# Patient Record
Sex: MALE
[De-identification: names, ages, dates, MRNs, and addresses within clinical notes are randomized per-mention and may not be internally consistent; named-entity substitution may affect disease eponyms.]

---

## 2024-07-24 ENCOUNTER — APPOINTMENT (OUTPATIENT)
Dept: PLASTIC SURGERY | Facility: CLINIC | Age: 29
End: 2024-07-24
Payer: COMMERCIAL

## 2024-07-24 VITALS
DIASTOLIC BLOOD PRESSURE: 102 MMHG | WEIGHT: 180 LBS | HEIGHT: 74 IN | BODY MASS INDEX: 23.1 KG/M2 | HEART RATE: 92 BPM | OXYGEN SATURATION: 98 % | SYSTOLIC BLOOD PRESSURE: 136 MMHG

## 2024-07-24 DIAGNOSIS — F64.9 GENDER IDENTITY DISORDER, UNSPECIFIED: ICD-10-CM

## 2024-07-24 PROBLEM — Z00.00 ENCOUNTER FOR PREVENTIVE HEALTH EXAMINATION: Status: ACTIVE | Noted: 2024-07-24

## 2024-07-24 PROCEDURE — 99204 OFFICE O/P NEW MOD 45 MIN: CPT

## 2024-10-21 ENCOUNTER — APPOINTMENT (OUTPATIENT)
Dept: CT IMAGING | Facility: CLINIC | Age: 29
End: 2024-10-21
Payer: COMMERCIAL

## 2024-10-21 ENCOUNTER — OUTPATIENT (OUTPATIENT)
Dept: OUTPATIENT SERVICES | Facility: HOSPITAL | Age: 29
LOS: 1 days | End: 2024-10-21

## 2024-10-21 PROCEDURE — 70486 CT MAXILLOFACIAL W/O DYE: CPT | Mod: 26

## 2024-10-22 ENCOUNTER — TRANSCRIPTION ENCOUNTER (OUTPATIENT)
Age: 29
End: 2024-10-22

## 2024-12-02 ENCOUNTER — APPOINTMENT (OUTPATIENT)
Dept: PLASTIC SURGERY | Facility: CLINIC | Age: 29
End: 2024-12-02

## 2024-12-02 DIAGNOSIS — F64.9 GENDER IDENTITY DISORDER, UNSPECIFIED: ICD-10-CM

## 2024-12-02 PROCEDURE — 99214 OFFICE O/P EST MOD 30 MIN: CPT

## 2024-12-16 VITALS
SYSTOLIC BLOOD PRESSURE: 139 MMHG | WEIGHT: 197.98 LBS | TEMPERATURE: 99 F | RESPIRATION RATE: 16 BRPM | DIASTOLIC BLOOD PRESSURE: 81 MMHG | HEART RATE: 86 BPM | HEIGHT: 74 IN | OXYGEN SATURATION: 95 %

## 2024-12-17 ENCOUNTER — INPATIENT (INPATIENT)
Facility: HOSPITAL | Age: 29
LOS: 1 days | Discharge: HOME | End: 2024-12-19
Attending: SURGERY | Admitting: SURGERY
Payer: MEDICAID

## 2024-12-17 ENCOUNTER — APPOINTMENT (OUTPATIENT)
Dept: PLASTIC SURGERY | Facility: HOSPITAL | Age: 29
End: 2024-12-17

## 2024-12-17 DIAGNOSIS — Z41.9 ENCOUNTER FOR PROCEDURE FOR PURPOSES OTHER THAN REMEDYING HEALTH STATE, UNSPECIFIED: Chronic | ICD-10-CM

## 2024-12-17 DIAGNOSIS — F64.0 TRANSSEXUALISM: ICD-10-CM

## 2024-12-17 PROCEDURE — 31899 UNLISTED PX TRACHEA BRONCHI: CPT

## 2024-12-17 PROCEDURE — 15824 RHYTIDECTOMY FOREHEAD: CPT

## 2024-12-17 PROCEDURE — 21122 GENIOP SLDG OSTEOT 2/>: CPT

## 2024-12-17 PROCEDURE — 30420 RECONSTRUCTION OF NOSE: CPT

## 2024-12-17 PROCEDURE — 21256 RECONSTRUCTION OF ORBIT: CPT

## 2024-12-17 PROCEDURE — 21270 AUGMENTATION CHEEK BONE: CPT | Mod: 50

## 2024-12-17 PROCEDURE — 21209 REDUCTION OF FACIAL BONES: CPT

## 2024-12-17 PROCEDURE — 21139 RDCTJ FOREHEAD CNTRG&SETBACK: CPT

## 2024-12-17 DEVICE — SCREW EMERG CROSS DRIVE TI 2X9: Type: IMPLANTABLE DEVICE | Status: FUNCTIONAL

## 2024-12-17 DEVICE — IMP IPS MARKING GUIDE TRANSFORM LP XS: Type: IMPLANTABLE DEVICE | Status: FUNCTIONAL

## 2024-12-17 DEVICE — SCREW MAXDRIVE 1 LVL 2X7MM: Type: IMPLANTABLE DEVICE | Status: FUNCTIONAL

## 2024-12-17 DEVICE — IMP MALAR MIDFACE PEEK LP 27MM: Type: IMPLANTABLE DEVICE | Status: FUNCTIONAL

## 2024-12-17 DEVICE — IMP IPS MARKING GUIDE CRANIAL TRANSFORM EXTRA LP: Type: IMPLANTABLE DEVICE | Status: FUNCTIONAL

## 2024-12-17 DEVICE — SCREW CR DRV 2.0X11MM: Type: IMPLANTABLE DEVICE | Status: FUNCTIONAL

## 2024-12-17 DEVICE — IMPLANTABLE DEVICE: Type: IMPLANTABLE DEVICE | Status: FUNCTIONAL

## 2024-12-17 DEVICE — IMP IPS MAND/ORTHOG LP TI-6AL-4V 67MM: Type: IMPLANTABLE DEVICE | Status: FUNCTIONAL

## 2024-12-17 RX ORDER — OXYCODONE HYDROCHLORIDE 30 MG/1
5 TABLET ORAL EVERY 4 HOURS
Refills: 0 | Status: DISCONTINUED | OUTPATIENT
Start: 2024-12-17 | End: 2024-12-19

## 2024-12-17 RX ORDER — METOCLOPRAMIDE HYDROCHLORIDE 10 MG/1
10 TABLET ORAL EVERY 6 HOURS
Refills: 0 | Status: DISCONTINUED | OUTPATIENT
Start: 2024-12-17 | End: 2024-12-19

## 2024-12-17 RX ORDER — SENNOSIDES 8.6 MG
2 TABLET ORAL AT BEDTIME
Refills: 0 | Status: DISCONTINUED | OUTPATIENT
Start: 2024-12-17 | End: 2024-12-19

## 2024-12-17 RX ORDER — ACETAMINOPHEN, DIPHENHYDRAMINE HCL, PHENYLEPHRINE HCL 325; 25; 5 MG/1; MG/1; MG/1
3 TABLET ORAL AT BEDTIME
Refills: 0 | Status: DISCONTINUED | OUTPATIENT
Start: 2024-12-17 | End: 2024-12-19

## 2024-12-17 RX ORDER — IBUPROFEN 200 MG
600 TABLET ORAL EVERY 6 HOURS
Refills: 0 | Status: COMPLETED | OUTPATIENT
Start: 2024-12-17 | End: 2025-11-15

## 2024-12-17 RX ORDER — BENZOCAINE, MENTHOL 15; 3.6 MG/1; MG/1
1 LOZENGE ORAL EVERY 4 HOURS
Refills: 0 | Status: DISCONTINUED | OUTPATIENT
Start: 2024-12-17 | End: 2024-12-19

## 2024-12-17 RX ORDER — ACETAMINOPHEN 500MG 500 MG/1
975 TABLET, COATED ORAL EVERY 6 HOURS
Refills: 0 | Status: COMPLETED | OUTPATIENT
Start: 2024-12-17 | End: 2025-11-15

## 2024-12-17 RX ORDER — CEFAZOLIN SODIUM 10 G
2000 VIAL (EA) INJECTION EVERY 8 HOURS
Refills: 0 | Status: COMPLETED | OUTPATIENT
Start: 2024-12-17 | End: 2024-12-19

## 2024-12-17 RX ORDER — PETROLATUM 960 MG/G
1 OINTMENT TOPICAL
Refills: 0 | Status: DISCONTINUED | OUTPATIENT
Start: 2024-12-17 | End: 2024-12-19

## 2024-12-17 RX ORDER — CHLORHEXIDINE GLUCONATE 1.2 MG/ML
15 RINSE ORAL
Refills: 0 | Status: DISCONTINUED | OUTPATIENT
Start: 2024-12-17 | End: 2024-12-19

## 2024-12-17 RX ORDER — DEXAMETHASONE 1.5 MG/1
8 TABLET ORAL EVERY 12 HOURS
Refills: 0 | Status: COMPLETED | OUTPATIENT
Start: 2024-12-17 | End: 2024-12-19

## 2024-12-17 RX ORDER — KETOROLAC TROMETHAMINE 30 MG/ML
15 INJECTION INTRAMUSCULAR; INTRAVENOUS ONCE
Refills: 0 | Status: DISCONTINUED | OUTPATIENT
Start: 2024-12-17 | End: 2024-12-19

## 2024-12-17 RX ORDER — ONDANSETRON HYDROCHLORIDE 4 MG/1
4 TABLET, FILM COATED ORAL ONCE
Refills: 0 | Status: DISCONTINUED | OUTPATIENT
Start: 2024-12-17 | End: 2024-12-19

## 2024-12-17 RX ORDER — GABAPENTIN 300 MG/1
300 CAPSULE ORAL EVERY 12 HOURS
Refills: 0 | Status: DISCONTINUED | OUTPATIENT
Start: 2024-12-17 | End: 2024-12-19

## 2024-12-17 RX ORDER — ACETAMINOPHEN 500MG 500 MG/1
1000 TABLET, COATED ORAL ONCE
Refills: 0 | Status: COMPLETED | OUTPATIENT
Start: 2024-12-17 | End: 2024-12-17

## 2024-12-17 RX ORDER — IBUPROFEN 200 MG
600 TABLET ORAL EVERY 6 HOURS
Refills: 0 | Status: DISCONTINUED | OUTPATIENT
Start: 2024-12-17 | End: 2024-12-19

## 2024-12-17 RX ORDER — ACETAMINOPHEN 500MG 500 MG/1
975 TABLET, COATED ORAL EVERY 6 HOURS
Refills: 0 | Status: DISCONTINUED | OUTPATIENT
Start: 2024-12-17 | End: 2024-12-19

## 2024-12-17 RX ORDER — DIPHENHYDRAMINE HCL 25 MG
12.5 CAPSULE ORAL ONCE
Refills: 0 | Status: DISCONTINUED | OUTPATIENT
Start: 2024-12-17 | End: 2024-12-19

## 2024-12-17 RX ORDER — 0.9 % SODIUM CHLORIDE 0.9 %
1000 INTRAVENOUS SOLUTION INTRAVENOUS
Refills: 0 | Status: DISCONTINUED | OUTPATIENT
Start: 2024-12-17 | End: 2024-12-18

## 2024-12-17 RX ORDER — OXYCODONE HYDROCHLORIDE 30 MG/1
10 TABLET ORAL EVERY 4 HOURS
Refills: 0 | Status: DISCONTINUED | OUTPATIENT
Start: 2024-12-17 | End: 2024-12-19

## 2024-12-17 RX ORDER — HYDROMORPHONE HYDROCHLORIDE 2 MG/1
0.5 TABLET ORAL ONCE
Refills: 0 | Status: DISCONTINUED | OUTPATIENT
Start: 2024-12-17 | End: 2024-12-17

## 2024-12-17 RX ORDER — DIAZEPAM 10 MG/1
5 TABLET ORAL EVERY 8 HOURS
Refills: 0 | Status: DISCONTINUED | OUTPATIENT
Start: 2024-12-17 | End: 2024-12-19

## 2024-12-17 RX ORDER — POVIDONE, POLYVINYL ALCOHOL 20; 27 G/1000ML; G/1000ML
2 SOLUTION OPHTHALMIC
Refills: 0 | Status: DISCONTINUED | OUTPATIENT
Start: 2024-12-17 | End: 2024-12-19

## 2024-12-17 RX ORDER — HYDRALAZINE HYDROCHLORIDE 10 MG/1
10 TABLET ORAL EVERY 4 HOURS
Refills: 0 | Status: DISCONTINUED | OUTPATIENT
Start: 2024-12-17 | End: 2024-12-19

## 2024-12-17 RX ADMIN — OXYCODONE HYDROCHLORIDE 10 MILLIGRAM(S): 30 TABLET ORAL at 22:50

## 2024-12-17 RX ADMIN — Medication 2 TABLET(S): at 22:00

## 2024-12-17 RX ADMIN — PETROLATUM 1 APPLICATION(S): 960 OINTMENT TOPICAL at 17:42

## 2024-12-17 RX ADMIN — CHLORHEXIDINE GLUCONATE 15 MILLILITER(S): 1.2 RINSE ORAL at 21:58

## 2024-12-17 RX ADMIN — POVIDONE, POLYVINYL ALCOHOL 2 DROP(S): 20; 27 SOLUTION OPHTHALMIC at 21:55

## 2024-12-17 RX ADMIN — BENZOCAINE, MENTHOL 1 LOZENGE: 15; 3.6 LOZENGE ORAL at 21:58

## 2024-12-17 RX ADMIN — HYDROMORPHONE HYDROCHLORIDE 0.5 MILLIGRAM(S): 2 TABLET ORAL at 16:31

## 2024-12-17 RX ADMIN — Medication 100 MILLIGRAM(S): at 21:57

## 2024-12-17 RX ADMIN — ACETAMINOPHEN 500MG 1000 MILLIGRAM(S): 500 TABLET, COATED ORAL at 21:54

## 2024-12-17 RX ADMIN — ACETAMINOPHEN 500MG 400 MILLIGRAM(S): 500 TABLET, COATED ORAL at 20:36

## 2024-12-17 RX ADMIN — OXYCODONE HYDROCHLORIDE 10 MILLIGRAM(S): 30 TABLET ORAL at 17:16

## 2024-12-17 RX ADMIN — Medication 75 MILLILITER(S): at 20:36

## 2024-12-17 RX ADMIN — DEXAMETHASONE 101.6 MILLIGRAM(S): 1.5 TABLET ORAL at 17:42

## 2024-12-17 RX ADMIN — BENZOCAINE, MENTHOL 1 LOZENGE: 15; 3.6 LOZENGE ORAL at 17:16

## 2024-12-17 RX ADMIN — Medication 75 MILLILITER(S): at 17:17

## 2024-12-17 RX ADMIN — OXYCODONE HYDROCHLORIDE 10 MILLIGRAM(S): 30 TABLET ORAL at 17:49

## 2024-12-17 RX ADMIN — GABAPENTIN 300 MILLIGRAM(S): 300 CAPSULE ORAL at 17:42

## 2024-12-17 RX ADMIN — OXYCODONE HYDROCHLORIDE 10 MILLIGRAM(S): 30 TABLET ORAL at 21:58

## 2024-12-17 RX ADMIN — PETROLATUM 1 APPLICATION(S): 960 OINTMENT TOPICAL at 22:01

## 2024-12-17 RX ADMIN — HYDROMORPHONE HYDROCHLORIDE 0.5 MILLIGRAM(S): 2 TABLET ORAL at 17:08

## 2024-12-17 NOTE — BRIEF OPERATIVE NOTE - NSICDXBRIEFPROCEDURE_GEN_ALL_CORE_FT
PROCEDURES:  Contouring, forehead, anterior frontal sinus wall, for reduction and setback 17-Dec-2024 08:40:36  Alla Jay  Orbital reconstruction 17-Dec-2024 08:40:45  Alla Jay  Browlift 17-Dec-2024 08:40:52  Alla Jay  Septorhinoplasty 17-Dec-2024 08:41:34  Alla Jay  Augmentation, malar, with implant insertion 17-Dec-2024 08:41:58  Alla Jay   PROCEDURES:  Contouring, forehead, anterior frontal sinus wall, for reduction and setback 17-Dec-2024 08:40:36  Alla Jay  Orbital reconstruction 17-Dec-2024 08:40:45  Alla Jay  Browlift 17-Dec-2024 08:40:52  Alla Jay  Septorhinoplasty 17-Dec-2024 08:41:34  Alla Jay  Augmentation, malar, with implant insertion 17-Dec-2024 08:41:58  Alla Jay  Genioplasty with multiple sliding osteotomies 17-Dec-2024 08:54:45  Alla Jay  Open repair of mandible by reduction in size without using fixation device 17-Dec-2024 08:55:25  Alla Jay   PROCEDURES:  Contouring, forehead, anterior frontal sinus wall, for reduction and setback 17-Dec-2024 08:40:36  Alla Jay  Orbital reconstruction 17-Dec-2024 08:40:45  Alla Jay  Browlift 17-Dec-2024 08:40:52  Alla Jay  Septorhinoplasty 17-Dec-2024 08:41:34  Alla Jay  Augmentation, malar, with implant insertion 17-Dec-2024 08:41:58  Alla Jay  Genioplasty with multiple sliding osteotomies 17-Dec-2024 08:54:45  Alla Jay  Open repair of mandible by reduction in size without using fixation device 17-Dec-2024 08:55:25  Alla Jay  Platysmaplasty 17-Dec-2024 14:30:51  Alla Jay  Tracheotomy, adult 17-Dec-2024 14:31:17  Alla Jay

## 2024-12-17 NOTE — BRIEF OPERATIVE NOTE - OPERATION/FINDINGS
prominent/enlarged frontal sinus, nasal and septal deviation, tracheal bulge, widened chin and mandibular angle

## 2024-12-18 RX ADMIN — DEXAMETHASONE 101.6 MILLIGRAM(S): 1.5 TABLET ORAL at 18:14

## 2024-12-18 RX ADMIN — Medication 100 MILLIGRAM(S): at 06:25

## 2024-12-18 RX ADMIN — Medication 2 TABLET(S): at 22:45

## 2024-12-18 RX ADMIN — ACETAMINOPHEN 500MG 975 MILLIGRAM(S): 500 TABLET, COATED ORAL at 06:23

## 2024-12-18 RX ADMIN — PETROLATUM 1 APPLICATION(S): 960 OINTMENT TOPICAL at 22:45

## 2024-12-18 RX ADMIN — OXYCODONE HYDROCHLORIDE 10 MILLIGRAM(S): 30 TABLET ORAL at 05:28

## 2024-12-18 RX ADMIN — Medication 600 MILLIGRAM(S): at 16:07

## 2024-12-18 RX ADMIN — ACETAMINOPHEN 500MG 975 MILLIGRAM(S): 500 TABLET, COATED ORAL at 12:48

## 2024-12-18 RX ADMIN — POVIDONE, POLYVINYL ALCOHOL 2 DROP(S): 20; 27 SOLUTION OPHTHALMIC at 05:17

## 2024-12-18 RX ADMIN — PETROLATUM 1 APPLICATION(S): 960 OINTMENT TOPICAL at 06:25

## 2024-12-18 RX ADMIN — Medication 100 MILLIGRAM(S): at 22:44

## 2024-12-18 RX ADMIN — OXYCODONE HYDROCHLORIDE 5 MILLIGRAM(S): 30 TABLET ORAL at 20:46

## 2024-12-18 RX ADMIN — CHLORHEXIDINE GLUCONATE 15 MILLILITER(S): 1.2 RINSE ORAL at 18:14

## 2024-12-18 RX ADMIN — GABAPENTIN 300 MILLIGRAM(S): 300 CAPSULE ORAL at 06:23

## 2024-12-18 RX ADMIN — PETROLATUM 1 APPLICATION(S): 960 OINTMENT TOPICAL at 00:17

## 2024-12-18 RX ADMIN — OXYCODONE HYDROCHLORIDE 10 MILLIGRAM(S): 30 TABLET ORAL at 06:27

## 2024-12-18 RX ADMIN — Medication 600 MILLIGRAM(S): at 22:44

## 2024-12-18 RX ADMIN — DEXAMETHASONE 101.6 MILLIGRAM(S): 1.5 TABLET ORAL at 05:16

## 2024-12-18 RX ADMIN — ACETAMINOPHEN 500MG 975 MILLIGRAM(S): 500 TABLET, COATED ORAL at 18:14

## 2024-12-18 RX ADMIN — Medication 600 MILLIGRAM(S): at 10:49

## 2024-12-18 RX ADMIN — OXYCODONE HYDROCHLORIDE 5 MILLIGRAM(S): 30 TABLET ORAL at 19:51

## 2024-12-18 RX ADMIN — PETROLATUM 1 APPLICATION(S): 960 OINTMENT TOPICAL at 09:22

## 2024-12-18 RX ADMIN — CHLORHEXIDINE GLUCONATE 15 MILLILITER(S): 1.2 RINSE ORAL at 06:22

## 2024-12-18 RX ADMIN — GABAPENTIN 300 MILLIGRAM(S): 300 CAPSULE ORAL at 18:14

## 2024-12-18 RX ADMIN — PETROLATUM 1 APPLICATION(S): 960 OINTMENT TOPICAL at 04:16

## 2024-12-18 RX ADMIN — HYDRALAZINE HYDROCHLORIDE 10 MILLIGRAM(S): 10 TABLET ORAL at 12:58

## 2024-12-18 RX ADMIN — Medication 100 MILLIGRAM(S): at 13:49

## 2024-12-18 RX ADMIN — PETROLATUM 1 APPLICATION(S): 960 OINTMENT TOPICAL at 18:13

## 2024-12-18 RX ADMIN — PETROLATUM 1 APPLICATION(S): 960 OINTMENT TOPICAL at 16:07

## 2024-12-19 ENCOUNTER — TRANSCRIPTION ENCOUNTER (OUTPATIENT)
Age: 29
End: 2024-12-19

## 2024-12-19 VITALS
DIASTOLIC BLOOD PRESSURE: 90 MMHG | SYSTOLIC BLOOD PRESSURE: 137 MMHG | HEART RATE: 86 BPM | TEMPERATURE: 98 F | OXYGEN SATURATION: 97 % | RESPIRATION RATE: 17 BRPM

## 2024-12-19 PROCEDURE — C9143: CPT

## 2024-12-19 PROCEDURE — C1889: CPT

## 2024-12-19 PROCEDURE — C1713: CPT

## 2024-12-19 RX ORDER — GABAPENTIN 300 MG/1
1 CAPSULE ORAL
Qty: 10 | Refills: 0
Start: 2024-12-19

## 2024-12-19 RX ORDER — OXYCODONE HYDROCHLORIDE 30 MG/1
1 TABLET ORAL
Qty: 12 | Refills: 0
Start: 2024-12-19 | End: 2024-12-21

## 2024-12-19 RX ORDER — IBUPROFEN 200 MG
1 TABLET ORAL
Qty: 28 | Refills: 0
Start: 2024-12-19 | End: 2024-12-25

## 2024-12-19 RX ORDER — CHLORHEXIDINE GLUCONATE 1.2 MG/ML
15 RINSE ORAL
Qty: 1 | Refills: 0
Start: 2024-12-19 | End: 2024-12-28

## 2024-12-19 RX ADMIN — Medication 100 MILLIGRAM(S): at 07:04

## 2024-12-19 RX ADMIN — ACETAMINOPHEN 500MG 975 MILLIGRAM(S): 500 TABLET, COATED ORAL at 07:06

## 2024-12-19 RX ADMIN — Medication 600 MILLIGRAM(S): at 04:33

## 2024-12-19 RX ADMIN — PETROLATUM 1 APPLICATION(S): 960 OINTMENT TOPICAL at 00:54

## 2024-12-19 RX ADMIN — GABAPENTIN 300 MILLIGRAM(S): 300 CAPSULE ORAL at 07:05

## 2024-12-19 RX ADMIN — DEXAMETHASONE 101.6 MILLIGRAM(S): 1.5 TABLET ORAL at 07:04

## 2024-12-19 RX ADMIN — PETROLATUM 1 APPLICATION(S): 960 OINTMENT TOPICAL at 07:05

## 2024-12-19 RX ADMIN — Medication 600 MILLIGRAM(S): at 10:25

## 2024-12-19 RX ADMIN — CHLORHEXIDINE GLUCONATE 15 MILLILITER(S): 1.2 RINSE ORAL at 07:05

## 2024-12-19 RX ADMIN — OXYCODONE HYDROCHLORIDE 5 MILLIGRAM(S): 30 TABLET ORAL at 04:32

## 2024-12-19 NOTE — ASU DISCHARGE PLAN (ADULT/PEDIATRIC) - NS MD DC FALL RISK RISK
For information on Fall & Injury Prevention, visit: [email or web address] OR  [email or web address] OR  https://www.cdc.gov/steadi/patient.html

## 2024-12-19 NOTE — PROGRESS NOTE ADULT - ASSESSMENT
30yo M to F w/ no PMH POD 0 s/p FFS - forehead, orbits, browlift, rhinosepto, malar implants, genio, angles, trach shave, platysmaplasty, L cheek dermal piercing removal. Pt doing well today, moderate periorbital swelling.     - FFS protocol   - OOBA
30yo M to F w/ no PMH POD 0 s/p FFS - forehead, orbits, browlift, rhinosepto, malar implants, genio, angles, trach shave, platysmaplasty, L cheek dermal piercing removal. Pt doing well today, moderate periorbital swelling.     - FFS protocol   - OOBA  - dc to home today

## 2024-12-19 NOTE — ASU DISCHARGE PLAN (ADULT/PEDIATRIC) - PROCEDURE
FFS - forehead, orbits, browlift, rhinosepto, malar implants, genio, angles, trach shave, platysmaplasty, L cheek dermal piercing removal

## 2024-12-19 NOTE — ASU DISCHARGE PLAN (ADULT/PEDIATRIC) - ASU DC SPECIAL INSTRUCTIONSFT
Please alternate take 650mg of Tylenol and in 3 hours, please take 2 regular strength Advil (Ibuprofen). Please alternate taking Tylenol and Advil every 3 hours even when you do not experience pain for the next 3 days. Please do not exceed 4,000mg of Tylenol in a 24 hour period.    MEDICATIONS:  >> A prescription for pain medication was e-prescribed to your pharmacy. Please take this as needed for severe pain.   >> Do not drive while taking the prescribed pain medication  >> Do not take the pain medication on an empty stomach, this may make you nauseous  >> Constipation is not uncommon when taking prescription pain medication. You may take an over the counter stool softener like Dulcolax or Sennakot to help with this.   >> You may take over the counter pain medication such as tylenol (acetaminophen) or Advil (ibuprofen) for pain.   >> Please use peridex rinse - swish and spit twice daily for 7 days.     ACTIVITY:  >> No bending or heavy lifting. Keep your head above the level of your heart. At your first post-op visit we will assess how you are doing and will adjust the restrictions as needed.   >> Sleep with head elevated on at least 2 pillows.     DIET:  >> Intraoral incisions: Clear liquid diet for first 48 hours then may advance to full liquid diet for 7 days. Then advance to soft diet. Avoid overly hot, cold, spicy, or acidic foods.   >> Do not drink alcohol in the immediate postoperative period and while taking prescription pain medication.     WOUND CARE:  >> Gently brush teeth and keep mouth clean after eating. At your first postop visit we will assess the wound and instruct you of any care needed.   >> Do not get your face wet, but you can wash your hair and the rest of your body. If you have a nasal splint - DO NOT GET THIS WET.   >> You may gently apply ice packs, or frozen peas to the nose and eye regions. Apply this for the first 48-72 hours. 20 minutes on and 20 minutes off. If you have a nasal splint, DO NOT apply ice to your nose.   >> Keep nasal splint on until your next postop visit, do not get this wet. Change gauze under nose as needed.  Nose will bleed over the next day or two.  This is normal.  It will be a mix of blood and mucus.  If you are noticing bleeding that does not stop or is saturating more than 2-3 gauzes per hour please call our office.   >> If you were given a facial/chin garment (Jaw Bra), please wear this 24/7 until your first postop visit.      Contact us with any questions or concerns. Please follow up with Dr. Shannon within 1 week of discharge from the hospital. You may call [phone] to schedule an appointment.

## 2024-12-19 NOTE — PROGRESS NOTE ADULT - SUBJECTIVE AND OBJECTIVE BOX
SUBJECTIVE:  Pt seen and evaluated at bedside this AM. Pt doing well, lying comfortably in bed. Endorses some pain o/n however well controlled w/ current regimen.    MEDICATIONS  (STANDING):  acetaminophen     Tablet .. 975 milliGRAM(s) Oral every 6 hours  AQUAPHOR (petrolatum Ointment) 1 Application(s) Topical every 3 hours  benzocaine/menthol Lozenge 1 Lozenge Oral every 4 hours  ceFAZolin   IVPB 2000 milliGRAM(s) IV Intermittent every 8 hours  chlorhexidine 0.12% Liquid 15 milliLiter(s) Oral Mucosa two times a day  dexAMETHasone  IVPB 8 milliGRAM(s) IV Intermittent every 12 hours  gabapentin 300 milliGRAM(s) Oral every 12 hours  ibuprofen  Suspension. 600 milliGRAM(s) Oral every 6 hours  lactated ringers. 1000 milliLiter(s) (75 mL/Hr) IV Continuous <Continuous>  ondansetron Injectable 4 milliGRAM(s) IV Push once  senna 2 Tablet(s) Oral at bedtime    MEDICATIONS  (PRN):  artificial tears (preservative free) Ophthalmic Solution 2 Drop(s) Both EYES every 2 hours PRN Dry Eyes  diazepam    Tablet 5 milliGRAM(s) Oral every 8 hours PRN ANXIETY  diphenhydrAMINE Injectable 12.5 milliGRAM(s) IV Push once PRN Itching  hydrALAZINE Injectable 10 milliGRAM(s) IV Push every 4 hours PRN SBP>150  ketorolac   Injectable 15 milliGRAM(s) IV Push once PRN breakthrough pain  melatonin 3 milliGRAM(s) Oral at bedtime PRN Sleep  metoclopramide Injectable 10 milliGRAM(s) IV Push every 6 hours PRN NAUSEA  oxyCODONE    IR 5 milliGRAM(s) Oral every 4 hours PRN Moderate Pain (4 - 6)  oxyCODONE    IR 10 milliGRAM(s) Oral every 4 hours PRN Severe Pain (7 - 10)      Vital Signs Last 24 Hrs  T(C): 37.3 (18 Dec 2024 05:28), Max: 38 (17 Dec 2024 21:00)  T(F): 99.1 (18 Dec 2024 05:28), Max: 100.4 (17 Dec 2024 21:00)  HR: 96 (18 Dec 2024 05:28) (86 - 106)  BP: 167/74 (18 Dec 2024 05:28) (135/80 - 170/91)  BP(mean): 94 (17 Dec 2024 17:59) (94 - 119)  RR: 17 (18 Dec 2024 05:28) (12 - 17)  SpO2: 98% (18 Dec 2024 05:28) (95% - 100%)    Parameters below as of 18 Dec 2024 05:28  Patient On (Oxygen Delivery Method): room air        Physical Exam:  General: NAD, resting comfortably in bed  Head: clean bandage in place, drain serosang, moderate periorbital swelling   Pulmonary: Nonlabored breathing, no respiratory distress  Cardiovascular: NSR  Abdominal: soft, NT/ND  Extremities: WWP, normal strength  Neuro: A/O x 3, CNs II-XII grossly intact, no focal deficits    I&O's Summary    17 Dec 2024 07:01  -  18 Dec 2024 07:00  --------------------------------------------------------  IN: 1020 mL / OUT: 830 mL / NET: 190 mL        LABS:              CAPILLARY BLOOD GLUCOSE            RADIOLOGY & ADDITIONAL STUDIES:  
SUBJECTIVE:  Pt seen and evaluated at bedside this AM. Pt doing well, tolerating CLD, denies nausea/vomiting.     MEDICATIONS  (STANDING):  acetaminophen     Tablet .. 975 milliGRAM(s) Oral every 6 hours  AQUAPHOR (petrolatum Ointment) 1 Application(s) Topical every 3 hours  benzocaine/menthol Lozenge 1 Lozenge Oral every 4 hours  ceFAZolin   IVPB 2000 milliGRAM(s) IV Intermittent every 8 hours  chlorhexidine 0.12% Liquid 15 milliLiter(s) Oral Mucosa two times a day  gabapentin 300 milliGRAM(s) Oral every 12 hours  ibuprofen  Suspension. 600 milliGRAM(s) Oral every 6 hours  ondansetron Injectable 4 milliGRAM(s) IV Push once  senna 2 Tablet(s) Oral at bedtime    MEDICATIONS  (PRN):  artificial tears (preservative free) Ophthalmic Solution 2 Drop(s) Both EYES every 2 hours PRN Dry Eyes  diazepam    Tablet 5 milliGRAM(s) Oral every 8 hours PRN ANXIETY  diphenhydrAMINE Injectable 12.5 milliGRAM(s) IV Push once PRN Itching  hydrALAZINE Injectable 10 milliGRAM(s) IV Push every 4 hours PRN SBP>150  ketorolac   Injectable 15 milliGRAM(s) IV Push once PRN breakthrough pain  melatonin 3 milliGRAM(s) Oral at bedtime PRN Sleep  metoclopramide Injectable 10 milliGRAM(s) IV Push every 6 hours PRN NAUSEA  oxyCODONE    IR 5 milliGRAM(s) Oral every 4 hours PRN Moderate Pain (4 - 6)  oxyCODONE    IR 10 milliGRAM(s) Oral every 4 hours PRN Severe Pain (7 - 10)      Vital Signs Last 24 Hrs  T(C): 36.6 (19 Dec 2024 04:34), Max: 37.3 (18 Dec 2024 12:50)  T(F): 97.9 (19 Dec 2024 04:34), Max: 99.2 (18 Dec 2024 12:50)  HR: 93 (19 Dec 2024 04:34) (80 - 120)  BP: 138/90 (19 Dec 2024 04:34) (135/87 - 160/89)  BP(mean): --  RR: 17 (19 Dec 2024 04:34) (16 - 19)  SpO2: 98% (19 Dec 2024 04:34) (94% - 98%)    Parameters below as of 19 Dec 2024 04:34  Patient On (Oxygen Delivery Method): room air        Physical Exam:  General: NAD, resting comfortably in bed  Head: dressing replaced, incision cdi, drain serosang removed   Pulmonary: Nonlabored breathing, no respiratory distress  Cardiovascular: NSR  Abdominal: soft, NT/ND  Extremities: WWP, normal strength  Neuro: A/O x 3, CNs II-XII grossly intact, no focal deficits    I&O's Summary    18 Dec 2024 07:01  -  19 Dec 2024 07:00  --------------------------------------------------------  IN: 940 mL / OUT: 2630 mL / NET: -1690 mL        LABS:              CAPILLARY BLOOD GLUCOSE            RADIOLOGY & ADDITIONAL STUDIES:

## 2024-12-19 NOTE — ASU DISCHARGE PLAN (ADULT/PEDIATRIC) - CARE PROVIDER_API CALL
Kam Shannon  Plastic Surgery  [address], [address]  [place] [address]  Phone: [phone]  Fax: [phone]  Follow Up Time: 1 week

## 2024-12-19 NOTE — ASU DISCHARGE PLAN (ADULT/PEDIATRIC) - FINANCIAL ASSISTANCE
[facility] provides services at a reduced cost to those who are determined to be eligible through [facility]’s financial assistance program. Information regarding [facility]’s financial assistance program can be found by going to [email or web address] or by calling [phone].

## 2024-12-24 RX ORDER — ESTRADIOL 0.05MG/24H
10 PATCH, TRANSDERMAL SEMIWEEKLY TRANSDERMAL
Refills: 0 | DISCHARGE

## 2024-12-26 DIAGNOSIS — F64.9 GENDER IDENTITY DISORDER, UNSPECIFIED: ICD-10-CM

## 2024-12-26 DIAGNOSIS — Z98.82 BREAST IMPLANT STATUS: ICD-10-CM

## 2024-12-27 ENCOUNTER — APPOINTMENT (OUTPATIENT)
Dept: PLASTIC SURGERY | Facility: CLINIC | Age: 29
End: 2024-12-27
Payer: COMMERCIAL

## 2024-12-27 ENCOUNTER — NON-APPOINTMENT (OUTPATIENT)
Age: 29
End: 2024-12-27

## 2024-12-27 VITALS
HEART RATE: 128 BPM | BODY MASS INDEX: 24.38 KG/M2 | SYSTOLIC BLOOD PRESSURE: 147 MMHG | OXYGEN SATURATION: 98 % | WEIGHT: 190 LBS | DIASTOLIC BLOOD PRESSURE: 92 MMHG | HEIGHT: 74 IN | TEMPERATURE: 98.6 F

## 2024-12-27 DIAGNOSIS — Z09 ENCOUNTER FOR FOLLOW-UP EXAMINATION AFTER COMPLETED TREATMENT FOR CONDITIONS OTHER THAN MALIGNANT NEOPLASM: ICD-10-CM

## 2024-12-27 DIAGNOSIS — F64.9 GENDER IDENTITY DISORDER, UNSPECIFIED: ICD-10-CM

## 2024-12-27 PROCEDURE — 99024 POSTOP FOLLOW-UP VISIT: CPT

## (undated) DEVICE — DRSG XEROFORM 5 X 9"

## (undated) DEVICE — CATH NG SALEM SUMP 16FR

## (undated) DEVICE — SUT VICRYL PLUS 2-0 18" CP-2 UNDYED (POP-OFF)

## (undated) DEVICE — DRILL BIT KLS MARTIN TWIST 1.5MM W 7MM STOP

## (undated) DEVICE — SUT VICYRL 3-0 18" PS-4C UNDYED

## (undated) DEVICE — MIDAS REX LEGEND TAPERED SM BORE 2.3MM X 8CM

## (undated) DEVICE — ELCTR BOVIE TIP BLADE VALLEYLAB 6.5"

## (undated) DEVICE — ROUTER TAPR 2.3MM BLU RED

## (undated) DEVICE — SUT PLAIN GUT FAST ABSORBING 5-0 PC-1

## (undated) DEVICE — ELCTR BOVIE PENCIL HANDPIECE ROCKER SWITCH 15FT

## (undated) DEVICE — RASP STRYKER LARGE TEAR CROSSCUT 14X7MM DISP

## (undated) DEVICE — DRSG CURITY GAUZE SPONGE 4 X 4" 12-PLY

## (undated) DEVICE — ELCTR HOLSTER ACCESSORY

## (undated) DEVICE — ILLUMINATOR MINI VERSALIGHT EXTENDED FRAZIER TIP

## (undated) DEVICE — Device

## (undated) DEVICE — SUT MONOCRYL 4-0 27" PS-2 UNDYED

## (undated) DEVICE — COMPRESSION CHIN-NECK SMALL

## (undated) DEVICE — ELCTR COLORADO 3CM

## (undated) DEVICE — RUBBERBAND STRL LTX FR 200/CA 3X1/8IN

## (undated) DEVICE — PRESSURE INFUSOR BAG 1000ML

## (undated) DEVICE — DRSG AQUAPLAST BLANK BLUSH 3 X 3"

## (undated) DEVICE — MEDICATION LABELS W MARKER

## (undated) DEVICE — TWIST DRILL 1.5MM DIA X 50MM W/NOTCH SGL USE ONLY

## (undated) DEVICE — BUR STRYKER EGG 4MM

## (undated) DEVICE — FOLEY TRAY 16FR 5CC LF UMETER CLOSED

## (undated) DEVICE — SUT ETHILON 6-0 18" P-3

## (undated) DEVICE — DRSG TELFA 3 X 8

## (undated) DEVICE — SUT MONOCRYL 4-0 18" P-3

## (undated) DEVICE — BIPOLAR FORCEP SYMMETRY BAYONET STR 8.25" X 1.5MM (BLUE)

## (undated) DEVICE — ZIMMER DERMACARRIER SKIN GRAFT MESH 8"

## (undated) DEVICE — NDL HYPO SAFE 25G X 1.5" (ORANGE)

## (undated) DEVICE — ELCTR GROUNDING PAD ADULT COVIDIEN

## (undated) DEVICE — SUT SILK 2-0 30" PSL

## (undated) DEVICE — ELCTR STRYKER NEPTUNE SMOKE EVACUATION PENCIL (GREEN)

## (undated) DEVICE — DRSG STERISTRIPS 0.5 X 4"

## (undated) DEVICE — SUT MONOCRYL 3-0 18" PS-2 UNDYED

## (undated) DEVICE — Q TIP 6" WOOD STEM

## (undated) DEVICE — DRAIN BLAKE 10FR ROUND

## (undated) DEVICE — MODEL IPS SUPPLEMENTAL TRANSFORM HALF LP

## (undated) DEVICE — SUT MONOCRYL 5-0 18" P-3 UNDYED

## (undated) DEVICE — SUT ETHILON 5-0 18" PS-2

## (undated) DEVICE — DRSG KERLIX ROLL LG 4.5"

## (undated) DEVICE — DRSG SPANDAGE TUBULAR ELASTIC RETAINER NET SIZE 10

## (undated) DEVICE — BLADE MANDIBULAR OSTEOTOMY

## (undated) DEVICE — STAPLER SKIN PROXIMATE

## (undated) DEVICE — AESCULAP SCALPFIX 10 CLIPS

## (undated) DEVICE — PACK PLASTIC ORTHOGNATHIC

## (undated) DEVICE — GLV 7.5 PROTEXIS (WHITE)

## (undated) DEVICE — SUT PROLENE 3-0 18" PS-1

## (undated) DEVICE — SAW BLADE STRYKER RECIPROCATING 27MMX0.38MM

## (undated) DEVICE — VAGINAL PACKING 2"

## (undated) DEVICE — DRSG MASTISOL